# Patient Record
Sex: MALE | Race: WHITE | NOT HISPANIC OR LATINO | Employment: OTHER | URBAN - METROPOLITAN AREA
[De-identification: names, ages, dates, MRNs, and addresses within clinical notes are randomized per-mention and may not be internally consistent; named-entity substitution may affect disease eponyms.]

---

## 2019-09-30 ENCOUNTER — TRANSFERRED RECORDS (OUTPATIENT)
Dept: HEALTH INFORMATION MANAGEMENT | Facility: CLINIC | Age: 84
End: 2019-09-30

## 2019-10-02 ENCOUNTER — TELEPHONE (OUTPATIENT)
Dept: FAMILY MEDICINE | Facility: CLINIC | Age: 84
End: 2019-10-02

## 2019-10-02 NOTE — TELEPHONE ENCOUNTER
Reason for Call:  Other     Detailed comments: patient is calling the [facility] to have them send records to clinic for pre op. [facility] to pre op on Friday 10/11/2019    Phone Number Patient can be reached at: Home number on file [phone] (home)    Best Time:     Can we leave a detailed message on this number? YES    Call taken on 10/2/2019 at 3:32 PM by Lesley Og

## 2019-10-03 NOTE — TELEPHONE ENCOUNTER
I placed the outside records we received via fax in the bin in your office for review.  Liliana Toure,

## 2024-02-13 ENCOUNTER — OFFICE VISIT (OUTPATIENT)
Dept: URGENT CARE | Facility: URGENT CARE | Age: 89
End: 2024-02-13
Payer: COMMERCIAL

## 2024-02-13 VITALS
RESPIRATION RATE: 24 BRPM | WEIGHT: 228 LBS | DIASTOLIC BLOOD PRESSURE: 81 MMHG | HEART RATE: 86 BPM | TEMPERATURE: 98.5 F | SYSTOLIC BLOOD PRESSURE: 123 MMHG | OXYGEN SATURATION: 92 %

## 2024-02-13 DIAGNOSIS — R26.81 UNSTEADY GAIT: ICD-10-CM

## 2024-02-13 DIAGNOSIS — R05.1 ACUTE COUGH: ICD-10-CM

## 2024-02-13 DIAGNOSIS — R42 LIGHTHEADEDNESS: Primary | ICD-10-CM

## 2024-02-13 DIAGNOSIS — R06.02 SHORTNESS OF BREATH: ICD-10-CM

## 2024-02-13 DIAGNOSIS — R53.1 WEAKNESS: ICD-10-CM

## 2024-02-13 PROCEDURE — 99205 OFFICE O/P NEW HI 60 MIN: CPT | Performed by: NURSE PRACTITIONER

## 2024-02-13 RX ORDER — LOSARTAN POTASSIUM 50 MG/1
1 TABLET ORAL DAILY
COMMUNITY
Start: 2023-03-14

## 2024-02-13 RX ORDER — AMLODIPINE BESYLATE 5 MG/1
1 TABLET ORAL DAILY
COMMUNITY
Start: 2023-03-14

## 2024-02-13 RX ORDER — WARFARIN SODIUM 5 MG/1
TABLET ORAL
COMMUNITY
Start: 2023-10-30

## 2024-02-13 RX ORDER — TAMSULOSIN HYDROCHLORIDE 0.4 MG/1
1 CAPSULE ORAL DAILY
COMMUNITY
Start: 2023-03-14

## 2024-02-13 RX ORDER — ATORVASTATIN CALCIUM 80 MG/1
1 TABLET, FILM COATED ORAL AT BEDTIME
COMMUNITY
Start: 2023-01-04

## 2024-02-13 RX ORDER — ALLOPURINOL 100 MG/1
100 TABLET ORAL
COMMUNITY
Start: 2022-03-14

## 2024-02-13 RX ORDER — PREDNISOLONE ACETATE 10 MG/ML
SUSPENSION/ DROPS OPHTHALMIC
COMMUNITY
Start: 2023-12-05

## 2024-02-13 NOTE — PROGRESS NOTES
Assessment & Plan     Lightheadedness      Acute cough      Shortness of breath      Weakness      Unsteady gait       Recommend further evaluation in emergency room for lightheadedness with shortness of breath, cough, weakness, unsteady gait near falling as higher level of care indicated with history of heart disease. Cannot rule out stroke/PE in urgent care. Patient agreeable and declines ambulance and is discharged in stable condition.         Mayelin Bach NP  [facility]    Melonie Gutierrez is a 89 year old male who presents to clinic today for the following health issues:  Chief Complaint   Patient presents with    Urgent Care    Respiratory Problems     Per patient symptoms have been ongoing for a week now, symptoms cough, wheezing , head pressure, and dizzy.     Patient presents for evaluation of cough. Associated symptoms: wheezing, dizziness causing difficulty walking, head pressure, weakness, shortness of breath, feeling feverish though does not have thermometer. Shortness of breath is moderate or more. No blurred vision currently. Cough is productive with yellow mucus. Symptoms have been present for about 5 days and have been stable. Nothing tried for symptoms. No known exposures. Denies emesis, sore throat. He almost went to the ED this morning for symptoms and did not due to wait time. Has been drinking fluids and voiding.     He has a history of anticoagulation and ICD, CAD, HTN, atrial fibrillation, MI.     Problem list, Medication list, Allergies, and Medical history reviewed in [facility].    ROS:  Review of systems negative except for noted above          Objective    /81   Pulse 86   Temp 98.5  F (36.9  C) (Tympanic)   Resp 24   Wt 103.4 kg (228 lb)   SpO2 92%   Physical Exam  Constitutional:       General: He is not in acute distress.     Appearance: He is not toxic-appearing or diaphoretic.   HENT:      Head: Normocephalic and atraumatic.      Nose:       Comments: Mild nasal congestion     Mouth/Throat:      Mouth: Mucous membranes are moist.      Pharynx: Oropharynx is clear. No oropharyngeal exudate or posterior oropharyngeal erythema.   Cardiovascular:      Rate and Rhythm: Normal rate and regular rhythm.      Heart sounds: Normal heart sounds.   Pulmonary:      Effort: No respiratory distress.      Breath sounds: No wheezing, rhonchi or rales.      Comments: Tachypnea, increased work of breathing  Lymphadenopathy:      Cervical: No cervical adenopathy.   Skin:     General: Skin is warm and dry.   Neurological:      General: No focal deficit present.      Mental Status: He is alert and oriented to person, place, and time.      Cranial Nerves: No cranial nerve deficit.      Sensory: No sensory deficit.      Motor: Weakness present.      Coordination: Coordination normal.      Gait: Gait abnormal.      Comments: Unsteady gait, wobbling and holding walls to walk